# Patient Record
Sex: FEMALE | Race: WHITE | URBAN - METROPOLITAN AREA
[De-identification: names, ages, dates, MRNs, and addresses within clinical notes are randomized per-mention and may not be internally consistent; named-entity substitution may affect disease eponyms.]

---

## 2017-03-09 ENCOUNTER — TRANSFERRED RECORDS (OUTPATIENT)
Dept: HEALTH INFORMATION MANAGEMENT | Facility: CLINIC | Age: 32
End: 2017-03-09

## 2017-04-04 ENCOUNTER — OFFICE VISIT (OUTPATIENT)
Dept: DERMATOLOGY | Facility: CLINIC | Age: 32
End: 2017-04-04
Payer: MEDICARE

## 2017-04-04 VITALS — HEART RATE: 82 BPM | DIASTOLIC BLOOD PRESSURE: 90 MMHG | OXYGEN SATURATION: 100 % | SYSTOLIC BLOOD PRESSURE: 151 MMHG

## 2017-04-04 DIAGNOSIS — L92.0 GRANULOMA ANNULARE: Primary | ICD-10-CM

## 2017-04-04 PROCEDURE — 36415 COLL VENOUS BLD VENIPUNCTURE: CPT | Performed by: DERMATOLOGY

## 2017-04-04 PROCEDURE — 86431 RHEUMATOID FACTOR QUANT: CPT | Performed by: DERMATOLOGY

## 2017-04-04 PROCEDURE — 99203 OFFICE O/P NEW LOW 30 MIN: CPT | Performed by: DERMATOLOGY

## 2017-04-04 PROCEDURE — 86038 ANTINUCLEAR ANTIBODIES: CPT | Performed by: DERMATOLOGY

## 2017-04-04 RX ORDER — ACETAMINOPHEN 325 MG/1
325-650 TABLET ORAL
COMMUNITY
Start: 2016-05-31

## 2017-04-04 RX ORDER — BENZTROPINE MESYLATE 2 MG/1
TABLET ORAL
COMMUNITY
Start: 2017-02-07

## 2017-04-04 RX ORDER — HALOPERIDOL 5 MG/1
TABLET ORAL
COMMUNITY
Start: 2017-02-07

## 2017-04-04 RX ORDER — TOPIRAMATE 100 MG/1
TABLET, FILM COATED ORAL
COMMUNITY
Start: 2017-02-07

## 2017-04-04 RX ORDER — LITHIUM CARBONATE 300 MG/1
300 CAPSULE ORAL
COMMUNITY
Start: 2017-02-07

## 2017-04-04 RX ORDER — MULTIVITAMIN WITH FOLIC ACID 400 MCG
1 TABLET ORAL
COMMUNITY
Start: 2016-07-14

## 2017-04-04 RX ORDER — TOPIRAMATE 50 MG/1
TABLET, FILM COATED ORAL
COMMUNITY
Start: 2017-02-07

## 2017-04-04 RX ORDER — HALOPERIDOL 0.5 MG/1
TABLET ORAL
COMMUNITY
Start: 2017-03-08

## 2017-04-04 RX ORDER — TOPIRAMATE 25 MG/1
TABLET, FILM COATED ORAL
COMMUNITY
Start: 2017-02-07

## 2017-04-04 NOTE — NURSING NOTE
Initial /90 (BP Location: Left arm, Patient Position: Chair, Cuff Size: Adult Regular)  Pulse 82  SpO2 100% There is no height or weight on file to calculate BMI. .

## 2017-04-04 NOTE — PROGRESS NOTES
Bessy Peña is a 31 year old year old female patient here today in consultation for GA on right leg by Dr. Reyna.  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  No other symptoms.  Only on r leg.  Patient states this has been present for a while.  Location r leg.  Patient reports the following symptoms:  tender .  Patient reports the following previous treatments none, just bx.  Patient reports the following modifying factors none.  Associated symptoms: none.  Patient has no other skin complaints today.  Remainder of the HPI, Meds, PMH, Allergies, FH, and SH was reviewed in chart.      Past Medical History:   Diagnosis Date     Anxiety      Depression      Developmental disability      History of IBS      History of migraine headaches        Past Surgical History:   Procedure Laterality Date     APPENDECTOMY       C REMOVABLE PARTIAL DENTURE          History reviewed. No pertinent family history.    Social History     Social History     Marital status: Single     Spouse name: N/A     Number of children: N/A     Years of education: N/A     Occupational History     Not on file.     Social History Main Topics     Smoking status: Never Smoker     Smokeless tobacco: Not on file     Alcohol use Not on file     Drug use: Not on file     Sexual activity: Not on file     Other Topics Concern     Not on file     Social History Narrative       Outpatient Encounter Prescriptions as of 4/4/2017   Medication Sig Dispense Refill     haloperidol (HALDOL) 5 MG tablet Take 1/2 tablet at bedtime.       Omega-3 Fatty Acids (FISH OIL OMEGA-3 PO) Take 1 capsule by mouth       lithium 300 MG capsule Take 300 mg by mouth       haloperidol (HALDOL) 0.5 MG tablet Take 1 tablet by mouth each morning, may take 1 extra tablet on 3/9/17 prior to cyst removal.       benztropine (COGENTIN) 2 MG tablet Take 1 tablet by mouth twice daily.       topiramate (TOPAMAX) 100 MG tablet Take 1 tab by mouth twice daily along with  50mg and 25mg tablet  Indications: MOOD       topiramate (TOPAMAX) 25 MG tablet Take 1 tablet by mouth twice daily along with 100mg and 50mg tablet       topiramate (TOPAMAX) 50 MG tablet Take 1 tablet by mouth twice daily along with 100mg and 25mg       acetaminophen (TYLENOL) 325 MG tablet Take 325-650 mg by mouth       Multiple Vitamin (DAILY-HOWIE) TABS Take 1 tablet by mouth       TraMADol HCl (ULTRAM PO)        acetaminophen (TYLENOL) 325 MG tablet Take 325-650 mg by mouth every 6 hours as needed       PSEUDOEPHEDRINE HCL PO Take by mouth as needed       Benzocaine-Menthol (CHLORASEPTIC) 6-10 MG LOZG        QUETIAPINE FUMARATE PO Take 25 mg by mouth as needed       loperamide (IMODIUM) 2 MG capsule Take 2 mg by mouth 4 times daily as needed       OXCARBAZEPINE PO        Omega-3 Fatty Acids (OMEGA-3 FISH OIL PO)        Cholecalciferol (VITAMIN D3 PO) Take by mouth daily       Multiple Vitamin (MULTI-VITAMIN DAILY PO)        traMADol (ULTRAM) 50 MG tablet Take by mouth every 6 hours as needed       Dextromethorphan-Guaifenesin (Q-TUSSIN DM PO) Reported on 4/4/2017       ibuprofen (ADVIL,MOTRIN) 600 MG tablet Take by mouth every 6 hours as needed Reported on 4/4/2017       No facility-administered encounter medications on file as of 4/4/2017.              Review Of Systems  Skin: As above  Eyes: negative  Ears/Nose/Throat: negative  Respiratory: No shortness of breath, dyspnea on exertion, cough, or hemoptysis  Cardiovascular: negative  Gastrointestinal: negative  Genitourinary: negative  Musculoskeletal: negative  Neurologic: negative  Psychiatric: negative  Hematologic/Lymphatic/Immunologic: negative  Endocrine: negative      O:   NAD, WDWN, Alert & Oriented, Mood & Affect wnl, Vitals stable   Here today with care giver   /90 (BP Location: Left arm, Patient Position: Chair, Cuff Size: Adult Regular)  Pulse 82  SpO2 100%   General appearance rodney ii   Vitals stable   Alert, oriented and in no acute  distress     r leg with soft movable nodules      The remainder of expanded problem focused exam was unremarkable; the following areas were examined:  scalp/hair, conjunctiva/lids, face, neck, lips      Eyes: Conjunctivae/lids:Normal     ENT: Lips, buccal mucosa, tongue: normal    MSK:Normal    Cardiovascular: peripheral edema none    Pulm: Breathing Normal    Lymph Nodes: No Head and Neck Lymphadenopathy     Neuro/Psych: Orientation:Normal; Mood/Affect:Normal      A/P:  1. Given path and clinical I favor deep GA  Would check sven and RF today  IL TAC and plaquenil discussed with patient and care giver  They will think about this  Return to clinic as needed once they decide   Pathophysiology discussed with pateint

## 2017-04-04 NOTE — MR AVS SNAPSHOT
"              After Visit Summary   2017    Bessy Peña    MRN: [id number]           Patient Information     Date Of Birth          1985        Visit Information        Provider Department      2017 11:15 AM Prince Cl[place] MD [facility]        Today's Diagnoses     Granuloma annulare    -  1       Follow-ups after your visit        Who to contact     If you have questions or need follow up information about today's clinic visit or your schedule please contact [facility] directly at [phone].  Normal or non-critical lab and imaging results will be communicated to you by MyChart, letter or phone within 4 business days after the clinic has received the results. If you do not hear from us within 7 days, please contact the clinic through [email or web address] or phone. If you have a critical or abnormal lab result, we will notify you by phone as soon as possible.  Submit refill requests through [employer] or call your pharmacy and they will forward the refill request to us. Please allow 3 business days for your refill to be completed.          Additional Information About Your Visit        MyChart Information     [employer] lets you send messages to your doctor, view your test results, renew your prescriptions, schedule appointments and more. To sign up, go to [email or web address] . Click on \"Log in\" on the left side of the screen, which will take you to the Welcome page. Then click on \"Sign up Now\" on the right side of the page.     You will be asked to enter the access code listed below, as well as some personal information. Please follow the directions to create your username and password.     Your access code is: [email or web address]  Expires: 7/3/2017 11:39 AM     Your access code will  in 90 days. If you need help or a new code, please call your [facility] or [phone].        Care EveryWhere ID     This is your Care EveryWhere ID. This could be used by other " organizations to access your [facility] medical records  [id number]        Your Vitals Were     Pulse Pulse Oximetry                82 100%           Blood Pressure from Last 3 Encounters:   04/04/17 151/90    Weight from Last 3 Encounters:   No data found for Wt              We Performed the Following     Antinuclear antibody screen by [facility]IA     Rheumatoid factor        Primary Care Provider Office Phone # Fax #    Coby Reyna [phone]       [facility] [place] [address]  [place] [address]        Thank you!     Thank you for choosing [facility]  for your care. Our goal is always to provide you with excellent care. Hearing back from our patients is one way we can continue to improve our services. Please take a few minutes to complete the written survey that you may receive in the mail after your visit with us. Thank you!             Your Updated Medication List - Protect others around you: Learn how to safely use, store and throw away your medicines at www.disposemymeds.org.          This list is accurate as of: 4/4/17 11:39 AM.  Always use your most recent med list.                   Brand Name Dispense Instructions for use    * acetaminophen 325 MG tablet    TYLENOL     Take 325-650 mg by mouth every 6 hours as needed       * acetaminophen 325 MG tablet    TYLENOL     Take 325-650 mg by mouth       benztropine 2 MG tablet    COGENTIN     Take 1 tablet by mouth twice daily.       CHLORASEPTIC 6-10 MG lozenge   Generic drug:  benzocaine-menthol          * haloperidol 5 MG tablet    HALDOL     Take 1/2 tablet at bedtime.       * haloperidol 0.5 MG tablet    HALDOL     Take 1 tablet by mouth each morning, may take 1 extra tablet on 3/9/17 prior to cyst removal.       ibuprofen 600 MG tablet    ADVIL/MOTRIN     Take by mouth every 6 hours as needed Reported on 4/4/2017       lithium 300 MG capsule      Take 300 mg by mouth       loperamide 2 MG capsule    IMODIUM     Take 2 mg by mouth 4  times daily as needed       * MULTI-VITAMIN DAILY PO          * DAILY-HOWIE Tabs      Take 1 tablet by mouth       * OMEGA-3 FISH OIL PO          * FISH OIL OMEGA-3 PO      Take 1 capsule by mouth       OXCARBAZEPINE PO          PSEUDOEPHEDRINE HCL PO      Take by mouth as needed       Q-TUSSIN DM PO      Reported on 4/4/2017       QUETIAPINE FUMARATE PO      Take 25 mg by mouth as needed       * topiramate 100 MG tablet    TOPAMAX     Take 1 tab by mouth twice daily along with 50mg and 25mg tablet  Indications: MOOD       * topiramate 25 MG tablet    TOPAMAX     Take 1 tablet by mouth twice daily along with 100mg and 50mg tablet       * topiramate 50 MG tablet    TOPAMAX     Take 1 tablet by mouth twice daily along with 100mg and 25mg       * ULTRAM PO          * traMADol 50 MG tablet    ULTRAM     Take by mouth every 6 hours as needed       VITAMIN D3 PO      Take by mouth daily       * Notice:  This list has 13 medication(s) that are the same as other medications prescribed for you. Read the directions carefully, and ask your doctor or other care provider to review them with you.

## 2017-04-05 LAB
ANA SER QL IA: NORMAL
RHEUMATOID FACT SER NEPH-ACNC: <20 IU/ML (ref 0–20)

## 2017-08-21 ENCOUNTER — TELEPHONE (OUTPATIENT)
Dept: DERMATOLOGY | Facility: CLINIC | Age: 32
End: 2017-08-21

## 2017-08-21 NOTE — TELEPHONE ENCOUNTER
Advised to schedule follow up appointment as per 4-4-17 Derm office visit dictation:     A/P:  1. Given path and clinical I favor deep GA  Would check sven and RF today  IL TAC and plaquenil discussed with patient and care giver  They will think about this  Return to clinic as needed once they decide   Pathophysiology discussed with Audrey Gilbert, will call back to schedule Derm appointment. Eden Corral RN

## 2017-08-21 NOTE — TELEPHONE ENCOUNTER
Reason for Call:  Other lumps on leg    Detailed comments: Latrice from [facility] states pt still has lumps on leg that are painful, wants to discuss treatment options for pt, please call    Phone Number Patient can be reached at: Other phone number:  [phone]  Best Time: today    Can we leave a detailed message on this number? NO    Call taken on 8/21/2017 at 2:10 PM by Priscilla Zelaya

## 2025-07-31 ENCOUNTER — TELEPHONE (OUTPATIENT)
Dept: OTOLARYNGOLOGY | Facility: CLINIC | Age: 40
End: 2025-07-31
Payer: COMMERCIAL

## 2025-07-31 NOTE — TELEPHONE ENCOUNTER
I spoke with the patient on the phone regarding her appointment tomorrow.  The patient has a history of severe obstructive sleep apnea intolerant to CPAP.  She recently had a sleep study in 2024 with an overall AHI of 27.5.  Her BMI at the time of the study was 55.3 kg/m .  She was referred by Dr. Mendez to discuss surgical options for her obstructive sleep apnea since she is having CPAP intolerance.    The patient is from [facility] which is quite a long drive to our office.  I contacted her to discuss preliminarily that there would not be a surgical option to cure her obstructive sleep apnea from an ENT standpoint (aside from tracheostomy, which would not be ideal in her situation).  Given that her BMI is over 50, the only surgical option I would consider curative potentially would be bariatric surgery.  With new GLP medications, it is quite possible that she could sustain weight loss that would lead to improvement in her obstructive sleep apnea symptoms.    We did discuss that there could be adjuvant ENT procedures such as tonsillectomy, tongue base reduction, or nasal surgery that could help with her CPAP tolerance but none of these would be expected to cure her sleep apnea especially with a BMI of over 50.  We did discuss specifically that inspire would not be an option given her BMI.    The patient will think about her options and would let me know if she still wants to discuss things tomorrow in clinic, which I think would be reasonable as long as expectations are that there would not be an ENT surgical procedure that would cure her obstructive sleep apnea at this time.    Conrado Ruiz MD  Department of Otolaryngology-Head and Neck Surgery  [facility]